# Patient Record
Sex: FEMALE | Race: WHITE | Employment: FULL TIME | URBAN - METROPOLITAN AREA
[De-identification: names, ages, dates, MRNs, and addresses within clinical notes are randomized per-mention and may not be internally consistent; named-entity substitution may affect disease eponyms.]

---

## 2020-06-24 ENCOUNTER — HOSPITAL ENCOUNTER (EMERGENCY)
Facility: CLINIC | Age: 30
Discharge: HOME | End: 2020-06-25
Attending: EMERGENCY MEDICINE | Admitting: EMERGENCY MEDICINE
Payer: COMMERCIAL

## 2020-06-24 DIAGNOSIS — R10.84 ABDOMINAL PAIN, GENERALIZED: ICD-10-CM

## 2020-06-24 DIAGNOSIS — K59.00 CONSTIPATION, UNSPECIFIED CONSTIPATION TYPE: ICD-10-CM

## 2020-06-24 LAB
ALBUMIN SERPL-MCNC: 3.7 G/DL (ref 3.4–5)
ALBUMIN UR-MCNC: 10 MG/DL
ALP SERPL-CCNC: 70 U/L (ref 40–150)
ALT SERPL W P-5'-P-CCNC: 21 U/L (ref 0–50)
ANION GAP SERPL CALCULATED.3IONS-SCNC: 4 MMOL/L (ref 3–14)
APPEARANCE UR: CLEAR
AST SERPL W P-5'-P-CCNC: 14 U/L (ref 0–45)
BACTERIA #/AREA URNS HPF: ABNORMAL /HPF
BASOPHILS # BLD AUTO: 0 10E9/L (ref 0–0.2)
BASOPHILS NFR BLD AUTO: 0.2 %
BILIRUB SERPL-MCNC: 0.4 MG/DL (ref 0.2–1.3)
BILIRUB UR QL STRIP: NEGATIVE
BUN SERPL-MCNC: 23 MG/DL (ref 7–30)
CALCIUM SERPL-MCNC: 8.8 MG/DL (ref 8.5–10.1)
CHLORIDE SERPL-SCNC: 106 MMOL/L (ref 94–109)
CO2 SERPL-SCNC: 30 MMOL/L (ref 20–32)
COLOR UR AUTO: YELLOW
CREAT SERPL-MCNC: 0.92 MG/DL (ref 0.52–1.04)
CRP SERPL-MCNC: 3.8 MG/L (ref 0–8)
DIFFERENTIAL METHOD BLD: ABNORMAL
EOSINOPHIL # BLD AUTO: 0.2 10E9/L (ref 0–0.7)
EOSINOPHIL NFR BLD AUTO: 1.3 %
ERYTHROCYTE [DISTWIDTH] IN BLOOD BY AUTOMATED COUNT: 12.4 % (ref 10–15)
GFR SERPL CREATININE-BSD FRML MDRD: 83 ML/MIN/{1.73_M2}
GLUCOSE SERPL-MCNC: 77 MG/DL (ref 70–99)
GLUCOSE UR STRIP-MCNC: NEGATIVE MG/DL
HCG SERPL QL: NEGATIVE
HCT VFR BLD AUTO: 39.8 % (ref 35–47)
HGB BLD-MCNC: 13.2 G/DL (ref 11.7–15.7)
HGB UR QL STRIP: NEGATIVE
IMM GRANULOCYTES # BLD: 0 10E9/L (ref 0–0.4)
IMM GRANULOCYTES NFR BLD: 0.2 %
KETONES UR STRIP-MCNC: 5 MG/DL
LEUKOCYTE ESTERASE UR QL STRIP: NEGATIVE
LYMPHOCYTES # BLD AUTO: 4.1 10E9/L (ref 0.8–5.3)
LYMPHOCYTES NFR BLD AUTO: 33.4 %
MCH RBC QN AUTO: 30.8 PG (ref 26.5–33)
MCHC RBC AUTO-ENTMCNC: 33.2 G/DL (ref 31.5–36.5)
MCV RBC AUTO: 93 FL (ref 78–100)
MONOCYTES # BLD AUTO: 0.7 10E9/L (ref 0–1.3)
MONOCYTES NFR BLD AUTO: 5.4 %
MUCOUS THREADS #/AREA URNS LPF: PRESENT /LPF
NEUTROPHILS # BLD AUTO: 7.3 10E9/L (ref 1.6–8.3)
NEUTROPHILS NFR BLD AUTO: 59.5 %
NITRATE UR QL: NEGATIVE
NRBC # BLD AUTO: 0 10*3/UL
NRBC BLD AUTO-RTO: 0 /100
PH UR STRIP: 6 PH (ref 5–7)
PLATELET # BLD AUTO: 266 10E9/L (ref 150–450)
POTASSIUM SERPL-SCNC: 3.9 MMOL/L (ref 3.4–5.3)
PROT SERPL-MCNC: 8 G/DL (ref 6.8–8.8)
RBC # BLD AUTO: 4.28 10E12/L (ref 3.8–5.2)
RBC #/AREA URNS AUTO: <1 /HPF (ref 0–2)
SODIUM SERPL-SCNC: 140 MMOL/L (ref 133–144)
SOURCE: ABNORMAL
SP GR UR STRIP: 1.03 (ref 1–1.03)
SPECIMEN SOURCE: NORMAL
SQUAMOUS #/AREA URNS AUTO: 1 /HPF (ref 0–1)
UROBILINOGEN UR STRIP-MCNC: NORMAL MG/DL (ref 0–2)
WBC # BLD AUTO: 12.2 10E9/L (ref 4–11)
WBC #/AREA URNS AUTO: 1 /HPF (ref 0–5)
WET PREP SPEC: NORMAL

## 2020-06-24 PROCEDURE — 87086 URINE CULTURE/COLONY COUNT: CPT | Performed by: EMERGENCY MEDICINE

## 2020-06-24 PROCEDURE — 87210 SMEAR WET MOUNT SALINE/INK: CPT | Performed by: EMERGENCY MEDICINE

## 2020-06-24 PROCEDURE — 84703 CHORIONIC GONADOTROPIN ASSAY: CPT | Performed by: EMERGENCY MEDICINE

## 2020-06-24 PROCEDURE — 99285 EMERGENCY DEPT VISIT HI MDM: CPT | Mod: 25 | Performed by: EMERGENCY MEDICINE

## 2020-06-24 PROCEDURE — 81001 URINALYSIS AUTO W/SCOPE: CPT | Performed by: EMERGENCY MEDICINE

## 2020-06-24 PROCEDURE — 85025 COMPLETE CBC W/AUTO DIFF WBC: CPT | Performed by: EMERGENCY MEDICINE

## 2020-06-24 PROCEDURE — 99284 EMERGENCY DEPT VISIT MOD MDM: CPT | Mod: Z6 | Performed by: EMERGENCY MEDICINE

## 2020-06-24 PROCEDURE — 86140 C-REACTIVE PROTEIN: CPT | Performed by: EMERGENCY MEDICINE

## 2020-06-24 PROCEDURE — 87491 CHLMYD TRACH DNA AMP PROBE: CPT | Performed by: EMERGENCY MEDICINE

## 2020-06-24 PROCEDURE — 80053 COMPREHEN METABOLIC PANEL: CPT | Performed by: EMERGENCY MEDICINE

## 2020-06-24 PROCEDURE — 25000132 ZZH RX MED GY IP 250 OP 250 PS 637: Performed by: EMERGENCY MEDICINE

## 2020-06-24 RX ORDER — DULOXETIN HYDROCHLORIDE 20 MG/1
20 CAPSULE, DELAYED RELEASE ORAL DAILY
COMMUNITY

## 2020-06-24 RX ORDER — BUPROPION HYDROCHLORIDE 300 MG/1
300 TABLET ORAL EVERY MORNING
COMMUNITY

## 2020-06-24 RX ORDER — ACETAMINOPHEN 500 MG
1000 TABLET ORAL ONCE
Status: COMPLETED | OUTPATIENT
Start: 2020-06-24 | End: 2020-06-24

## 2020-06-24 RX ADMIN — ACETAMINOPHEN 1000 MG: 500 TABLET ORAL at 23:02

## 2020-06-24 SDOH — HEALTH STABILITY: MENTAL HEALTH: HOW OFTEN DO YOU HAVE A DRINK CONTAINING ALCOHOL?: NEVER

## 2020-06-24 NOTE — ED AVS SNAPSHOT
[facility], [place], Emergency Department  [address]  [place] [address]  Phone:  [phone]  Fax:  [phone]                                    Conrad Farrell   MRN: [id number]    Department:  [facility], Emergency Department   Date of Visit:  6/24/2020           After Visit Summary Signature Page    I have received my discharge instructions, and my questions have been answered. I have discussed any challenges I see with this plan with the nurse or doctor.    ..........................................................................................................................................  Patient/Patient Representative Signature      ..........................................................................................................................................  Patient Representative Print Name and Relationship to Patient    ..................................................               ................................................  Date                                   Time    ..........................................................................................................................................  Reviewed by Signature/Title    ...................................................              ..............................................  Date                                               Time          22EPIC Rev 08/18

## 2020-06-24 NOTE — LETTER
June 25, 2020      To Whom It May Concern:      Conrad Farrell was seen in our Emergency Department today, 06/25/20.     Sincerely,        Yolie Molina MD

## 2020-06-25 ENCOUNTER — APPOINTMENT (OUTPATIENT)
Dept: CT IMAGING | Facility: CLINIC | Age: 30
End: 2020-06-25
Attending: EMERGENCY MEDICINE
Payer: COMMERCIAL

## 2020-06-25 VITALS
DIASTOLIC BLOOD PRESSURE: 77 MMHG | SYSTOLIC BLOOD PRESSURE: 106 MMHG | WEIGHT: 185 LBS | RESPIRATION RATE: 16 BRPM | OXYGEN SATURATION: 98 % | HEART RATE: 67 BPM | TEMPERATURE: 97.7 F

## 2020-06-25 LAB
C TRACH DNA SPEC QL NAA+PROBE: NEGATIVE
SPECIMEN SOURCE: NORMAL

## 2020-06-25 PROCEDURE — 74177 CT ABD & PELVIS W/CONTRAST: CPT

## 2020-06-25 PROCEDURE — 25000125 ZZHC RX 250: Performed by: EMERGENCY MEDICINE

## 2020-06-25 PROCEDURE — 25000128 H RX IP 250 OP 636: Performed by: EMERGENCY MEDICINE

## 2020-06-25 PROCEDURE — 25000132 ZZH RX MED GY IP 250 OP 250 PS 637: Performed by: EMERGENCY MEDICINE

## 2020-06-25 RX ORDER — IOPAMIDOL 755 MG/ML
100 INJECTION, SOLUTION INTRAVASCULAR ONCE
Status: COMPLETED | OUTPATIENT
Start: 2020-06-25 | End: 2020-06-25

## 2020-06-25 RX ADMIN — DOCUSATE SODIUM 286 ML: 50 LIQUID ORAL at 02:11

## 2020-06-25 RX ADMIN — IOPAMIDOL 91 ML: 755 INJECTION, SOLUTION INTRAVENOUS at 00:34

## 2020-06-25 RX ADMIN — SODIUM CHLORIDE 63 ML: 9 INJECTION, SOLUTION INTRAVENOUS at 00:34

## 2020-06-25 NOTE — DISCHARGE INSTRUCTIONS
TODAY'S VISIT:  You were seen today for abdominal pain.  The only abnormality noted on CT scan was constipation.  Would recommend treating for this and reassessing to see if this improves your abdominal pain.  Please monitor your symptoms closely and return to the emergency department if you develop new or worsening pain  -   - You should discuss all imaging/radiology tests and laboratory tests that were performed during this visit with your usual providers to ensure you continue to improve and do not need any further evaluation, testing or management.   - Please call your Primary Care team to discuss and arrange a follow-up appointment.     - If you do not have a primary care provider, you can be seen in follow-up and establish care with one of our providers by calling of the the clinics below:  --- [facility] (phone: [phone])  --- [facility] / [facility] (phone: [phone])   - Have your provider review the results from today's visit with you again to make sure no further follow-up or additional testing is needed based on those results.     PRESCRIPTIONS / MEDICATIONS:  -Would recommend MiraLAX, 1 capful titrate to effect.  Aim for 2-3 soft stools a day for 2 to 3 days.  Take with plenty of water.  Once affect then decrease MiraLAX and can start a stool softener such as Colace.     OTHER INSTRUCTIONS:  - Do your best to stay hydrated.  MiraLAX needs a large volumes of water to work.    RETURN TO THE EMERGENCY DEPARTMENT  Return to the Emergency Department immediately for any new or worsening symptoms or any concerns.     Remember that you can always come back to the Emergency Department if you are not able to see your regular doctor in the amount of time listed above, if you get any new symptoms, or if there is anything that worries you.

## 2020-06-25 NOTE — ED PROVIDER NOTES
[facility] EMERGENCY DEPARTMENT ([facility])     June 24, 2020    History     Chief Complaint   Patient presents with     Abdominal Pain     Lower abdominal sharp stabbing pain, started about a week and half ago. Today it has been constant and radiates into the right back.     HPI  Conrad Farrell is a 29 year old female who presents to the Emergency Department with lower abdominal pain. Patient reports lower bilateral abdominal pain began 1.5 weeks ago and is a sharp, stabbing pain.  Initially pain came in short episode lasting approximately 3 to 4 minutes at a time coming 4-5 times a day.  Today however the pain became constant, lasting for several hours and now radiating to her low back on the right.  She reports it is pelvic in nature though does not radiate into the vagina.  She has no vaginal symptoms.  Denies any urinary symptoms.  No history of pelvic infections.  No concern she could be pregnant.  LMP 1 week ago.  No history of ovarian pathology.  No associated nausea, vomiting, diarrhea, constipation.  No fevers or other systemic symptoms.      PAST MEDICAL HISTORY:   Past Medical History:   Diagnosis Date     Depressive disorder        PAST SURGICAL HISTORY: History reviewed. No pertinent surgical history.    Past medical history, past surgical history, medications, and allergies were reviewed with the patient. Additional pertinent items: None    FAMILY HISTORY: History reviewed. No pertinent family history.    SOCIAL HISTORY:   Social History     Tobacco Use     Smoking status: Never Smoker     Smokeless tobacco: Never Used   Substance Use Topics     Alcohol use: Not Currently     Frequency: Never     Social history was reviewed with the patient. Additional pertinent items: None      Patient's Medications   New Prescriptions    No medications on file   Previous Medications    BUPROPION (WELLBUTRIN XL) 300 MG 24 HR TABLET    Take 300 mg by mouth every morning    DULOXETINE (CYMBALTA)  20 MG CAPSULE    Take 20 mg by mouth daily   Modified Medications    No medications on file   Discontinued Medications    No medications on file        No Known Allergies     Review of Systems  A complete review of systems was performed with pertinent positives and negatives noted in the HPI, and all other systems negative.    Physical Exam   BP: 134/85  Heart Rate: 77  Temp: 97.6  F (36.4  C)  Resp: 16  Weight: 83.9 kg (185 lb)  SpO2: 97 %      Physical Exam   General: awake, alert, NAD however winces in pain when moving on the gurney to position for exam  Head: normal cephalic  HEENT: pupils equal, conjugate gaze in tact  Neck: Supple  CV: regular rate and rhythm without murmur  Lungs: clear to ascultation  Abd: soft, no guarding, no peritoneal signs.  Pain to deep condition in lateral bilateral right lower quadrant and left lower quadrant.   Pelvic: No cervical motion tenderness, no adnexal tenderness  EXT: lower extremities without swelling or edema  Neuro: awake, answers questions appropriately. No focal deficits noted         ED Course        Procedures        Results for orders placed or performed during the hospital encounter of 06/24/20 (from the past 24 hour(s))   CBC with platelets differential   Result Value Ref Range    WBC 12.2 (H) 4.0 - 11.0 10e9/L    RBC Count 4.28 3.8 - 5.2 10e12/L    Hemoglobin 13.2 11.7 - 15.7 g/dL    Hematocrit 39.8 35.0 - 47.0 %    MCV 93 78 - 100 fl    MCH 30.8 26.5 - 33.0 pg    MCHC 33.2 31.5 - 36.5 g/dL    RDW 12.4 10.0 - 15.0 %    Platelet Count 266 150 - 450 10e9/L    Diff Method Automated Method     % Neutrophils 59.5 %    % Lymphocytes 33.4 %    % Monocytes 5.4 %    % Eosinophils 1.3 %    % Basophils 0.2 %    % Immature Granulocytes 0.2 %    Nucleated RBCs 0 0 /100    Absolute Neutrophil 7.3 1.6 - 8.3 10e9/L    Absolute Lymphocytes 4.1 0.8 - 5.3 10e9/L    Absolute Monocytes 0.7 0.0 - 1.3 10e9/L    Absolute Eosinophils 0.2 0.0 - 0.7 10e9/L    Absolute Basophils 0.0 0.0 -  0.2 10e9/L    Abs Immature Granulocytes 0.0 0 - 0.4 10e9/L    Absolute Nucleated RBC 0.0    Comprehensive metabolic panel   Result Value Ref Range    Sodium 140 133 - 144 mmol/L    Potassium 3.9 3.4 - 5.3 mmol/L    Chloride 106 94 - 109 mmol/L    Carbon Dioxide 30 20 - 32 mmol/L    Anion Gap 4 3 - 14 mmol/L    Glucose 77 70 - 99 mg/dL    Urea Nitrogen 23 7 - 30 mg/dL    Creatinine 0.92 0.52 - 1.04 mg/dL    GFR Estimate 83 >60 mL/min/[1.73_m2]    GFR Estimate If Black >90 >60 mL/min/[1.73_m2]    Calcium 8.8 8.5 - 10.1 mg/dL    Bilirubin Total 0.4 0.2 - 1.3 mg/dL    Albumin 3.7 3.4 - 5.0 g/dL    Protein Total 8.0 6.8 - 8.8 g/dL    Alkaline Phosphatase 70 40 - 150 U/L    ALT 21 0 - 50 U/L    AST 14 0 - 45 U/L   CRP inflammation   Result Value Ref Range    CRP Inflammation 3.8 0.0 - 8.0 mg/L   HCG qualitative Blood   Result Value Ref Range    HCG Qualitative Serum Negative NEG^Negative   UA with Microscopic   Result Value Ref Range    Color Urine Yellow     Appearance Urine Clear     Glucose Urine Negative NEG^Negative mg/dL    Bilirubin Urine Negative NEG^Negative    Ketones Urine 5 (A) NEG^Negative mg/dL    Specific Gravity Urine 1.031 1.003 - 1.035    Blood Urine Negative NEG^Negative    pH Urine 6.0 5.0 - 7.0 pH    Protein Albumin Urine 10 (A) NEG^Negative mg/dL    Urobilinogen mg/dL Normal 0.0 - 2.0 mg/dL    Nitrite Urine Negative NEG^Negative    Leukocyte Esterase Urine Negative NEG^Negative    Source Midstream Urine     WBC Urine 1 0 - 5 /HPF    RBC Urine <1 0 - 2 /HPF    Bacteria Urine Few (A) NEG^Negative /HPF    Squamous Epithelial /HPF Urine 1 0 - 1 /HPF    Mucous Urine Present (A) NEG^Negative /LPF   Urine Culture    Specimen: Urine clean catch; Midstream Urine   Result Value Ref Range    Specimen Description Midstream Urine     Special Requests Specimen received in preservative     Culture Micro PENDING    Wet prep    Specimen: Cervix   Result Value Ref Range    Specimen Description Cervix     Wet Prep  Moderate  PMNs seen       Wet Prep No Trichomonas seen     Wet Prep No clue cells seen     Wet Prep No yeast seen    CT Abdomen Pelvis w Contrast    Narrative    EXAM: CT ABDOMEN PELVIS W CONTRAST  LOCATION: [facility]  DATE/TIME: 6/25/2020 12:17 AM    INDICATION: Abdominal pain, leukocytosis  COMPARISON: None.  TECHNIQUE: CT scan of the abdomen and pelvis was performed following injection of IV contrast. Multiplanar reformats were obtained. Dose reduction techniques were used.  CONTRAST: 91ml's     FINDINGS:   LOWER CHEST: Normal.    HEPATOBILIARY: Normal.    PANCREAS: Normal.    SPLEEN: Normal.    ADRENAL GLANDS: Normal.    KIDNEYS/BLADDER: No hydronephrosis or urinary tract stone. Normal renal enhancement. Bladder is unremarkable.    BOWEL: Relatively large amount of colonic stool. No obstruction or acute inflammatory process. Colonic diverticulosis is noted. There is some fecal-type content noted in the distal ileum, likely stasis. Normal appendix is seen. No free fluid or   pneumoperitoneum. The stomach is unremarkable.    LYMPH NODES: Mildly enlarged 10 mm node in the ileocolic mesentery, likely reactive.    VASCULATURE: Unremarkable.    PELVIC ORGANS: Anteverted uterus is normal in size and appearance. The ovaries appear normal. No pelvic free fluid or inflammatory process. Normal appearance of the rectum.    MUSCULOSKELETAL: No acute findings. Mild levocurvature of the lumbar spine.      Impression    IMPRESSION:   1.  Prominent amount of colonic stool and suggestion of stasis in the distal ileum. Correlate for constipation. No evidence of bowel obstruction or acute inflammatory process of the bowel. Mild colonic diverticulosis noted.     Medications   pink lady enema (COMPOUNDED: docusate, magnesium citrate, mineral oil, sodium phosphate) (has no administration in time range)   acetaminophen (TYLENOL) tablet 1,000 mg (1,000 mg Oral Given 6/24/20 2302)   iopamidol (ISOVUE-370) solution  100 mL (91 mLs Intravenous Given 6/25/20 0034)   sodium chloride 0.9 % bag 500mL for CT scan flush use (63 mLs Intravenous Given 6/25/20 0034)             Assessments & Plan (with Medical Decision Making)   Conrad is a 29-year-old female who presents with pelvic versus lower abdominal pain.  Patient has an unremarkable pelvic exam, both tenderness to left lower quadrant and right lower quadrant on exam.    Differential would include things such as pelvic infection, pregnancy, ectopic pregnancy, ovarian torsion also consider abdominal pathology such as appendicitis or other type of intra-abdominal infection.    Basic labs notable for negative pregnancy ruling out ectopic.  Urine without evidence of infection.  Is a slightly elevated white count but no left shift.  Normal electrolytes, LFTs normal.  Normal CRP.    Patient's pain did not seem particularly pelvic in nature on exam so we will proceed with CT abdomin/pelvis to assess pain.    CT scan demonstrates only constipation.  Normal-appearing appendix, no colonic inflammation noted, normal ovaries and pelvis.  On reexam patient continues to have pain in her right lower and left lower quadrant, no pelvic tenderness, no guarding or peritoneal signs.      At this point will try pink lady enema and instruction for good bowel cleanout using MiraLAX at home.  Discussed diagnostic uncertainty and encourage patient to return to the emergency department should she develop new or worsening symptoms.    I have reviewed the nursing notes.    I have reviewed the findings, diagnosis, plan and need for follow up with the patient.    New Prescriptions    No medications on file       Final diagnoses:   Abdominal pain, generalized   Constipation, unspecified constipation type       6/24/2020   [facility], [place], EMERGENCY DEPARTMENT     Sacha Casillas MD  06/25/20 0204

## 2020-06-26 LAB
BACTERIA SPEC CULT: NORMAL
Lab: NORMAL
SPECIMEN SOURCE: NORMAL

## 2020-06-26 NOTE — RESULT ENCOUNTER NOTE
Final urine culture report is NEGATIVE per [facility] Result protocol.    If NEGATIVE result, no change in treatment, per [facility] Result protocol.

## 2021-01-04 ENCOUNTER — HEALTH MAINTENANCE LETTER (OUTPATIENT)
Age: 31
End: 2021-01-04

## 2021-10-10 ENCOUNTER — HEALTH MAINTENANCE LETTER (OUTPATIENT)
Age: 31
End: 2021-10-10

## 2022-01-30 ENCOUNTER — HEALTH MAINTENANCE LETTER (OUTPATIENT)
Age: 32
End: 2022-01-30

## 2022-09-24 ENCOUNTER — HEALTH MAINTENANCE LETTER (OUTPATIENT)
Age: 32
End: 2022-09-24

## 2023-05-08 ENCOUNTER — HEALTH MAINTENANCE LETTER (OUTPATIENT)
Age: 33
End: 2023-05-08